# Patient Record
Sex: FEMALE | Race: BLACK OR AFRICAN AMERICAN | NOT HISPANIC OR LATINO | Employment: PART TIME | URBAN - METROPOLITAN AREA
[De-identification: names, ages, dates, MRNs, and addresses within clinical notes are randomized per-mention and may not be internally consistent; named-entity substitution may affect disease eponyms.]

---

## 2019-09-09 ENCOUNTER — SOCIAL WORK (OUTPATIENT)
Dept: ADMINISTRATIVE | Facility: OTHER | Age: 24
End: 2019-09-09

## 2019-09-09 NOTE — PROGRESS NOTES
SW met with pt regarding initial OB assessment. Pt stated this is her 2nd pregnancy/0-miscarriage. Pt stated lives with her grandmother/child-3 and able to perform ADL's independently. Pt stated support system is her aunt/Li/Mother-in-law/Kallie. Pt stated has medicaid([facility]). Pt stated does not have WIC. SW provide pt with information on other community resources.SW faxed and scanned pt's notification of pregnancy into epic.  No other needs identified at this time.    Melisa Mckenzie,MSW  Pager#[phone]

## 2019-09-26 PROBLEM — Z82.49 FAMILY HISTORY OF CARDIAC DISORDER: Status: ACTIVE | Noted: 2019-09-26

## 2019-09-26 PROBLEM — O09.299 H/O SHOULDER DYSTOCIA IN PRIOR PREGNANCY, CURRENTLY PREGNANT: Status: ACTIVE | Noted: 2019-09-26

## 2020-02-03 PROBLEM — K21.9 ACID REFLUX: Status: ACTIVE | Noted: 2020-02-03

## 2020-04-13 PROBLEM — Z3A.38 38 WEEKS GESTATION OF PREGNANCY: Status: ACTIVE | Noted: 2020-04-13

## 2020-04-13 PROBLEM — O26.893 ABDOMINAL PAIN DURING PREGNANCY, THIRD TRIMESTER: Status: ACTIVE | Noted: 2020-04-13

## 2020-04-13 PROBLEM — R10.9 ABDOMINAL PAIN DURING PREGNANCY, THIRD TRIMESTER: Status: ACTIVE | Noted: 2020-04-13

## 2020-04-15 PROBLEM — Z98.891 S/P PRIMARY LOW TRANSVERSE C-SECTION: Status: ACTIVE | Noted: 2020-04-15

## 2020-12-21 PROBLEM — O09.299 H/O SHOULDER DYSTOCIA IN PRIOR PREGNANCY, CURRENTLY PREGNANT: Status: RESOLVED | Noted: 2019-09-26 | Resolved: 2020-12-21

## 2021-03-22 PROBLEM — Z3A.38 38 WEEKS GESTATION OF PREGNANCY: Status: RESOLVED | Noted: 2020-04-13 | Resolved: 2021-03-22

## 2022-11-17 PROBLEM — R10.9 ABDOMINAL PAIN DURING PREGNANCY, THIRD TRIMESTER: Status: RESOLVED | Noted: 2020-04-13 | Resolved: 2022-11-17

## 2022-11-17 PROBLEM — O20.9 VAGINAL BLEEDING AFFECTING EARLY PREGNANCY: Status: ACTIVE | Noted: 2022-11-17

## 2022-11-17 PROBLEM — O03.9 MISCARRIAGE: Status: ACTIVE | Noted: 2022-11-17

## 2022-11-17 PROBLEM — O26.893 ABDOMINAL PAIN DURING PREGNANCY, THIRD TRIMESTER: Status: RESOLVED | Noted: 2020-04-13 | Resolved: 2022-11-17

## 2022-11-21 PROBLEM — O03.9 SPONTANEOUS ABORTION: Status: ACTIVE | Noted: 2022-11-21

## 2023-04-27 ENCOUNTER — SOCIAL WORK (OUTPATIENT)
Dept: ADMINISTRATIVE | Facility: OTHER | Age: 28
End: 2023-04-27

## 2023-04-27 NOTE — PROGRESS NOTES
SW met with pt regarding initial OB assessment. Pt stated this is her 4th pregnancy/1-miscarriage. Pt stated lives with her grandmother/children-7,3 and able to perform ADL's independently. Pt stated does work. Pt stated support system is her mother/Christina. Pt stated [facility] Pt stated does have WIC. Pt stated not going to breastfeed. SW provide pt with information on other community resources.KIRAN faxed and scanned pt's notification of pregnancy into epic.  No other needs identified at this time.    Melisa Mckenzie,MSW  Pager#[phone]

## 2023-05-01 PROBLEM — O23.40 UTI (URINARY TRACT INFECTION) DURING PREGNANCY: Status: ACTIVE | Noted: 2023-05-01

## 2023-05-01 PROBLEM — O99.210 OBESITY IN PREGNANCY: Status: ACTIVE | Noted: 2023-05-01

## 2023-05-01 PROBLEM — O03.9 SPONTANEOUS ABORTION: Status: RESOLVED | Noted: 2022-11-21 | Resolved: 2023-05-01

## 2023-05-01 PROBLEM — Z86.19 HISTORY OF SYPHILIS: Status: ACTIVE | Noted: 2023-05-01

## 2023-05-01 PROBLEM — O20.9 VAGINAL BLEEDING AFFECTING EARLY PREGNANCY: Status: RESOLVED | Noted: 2022-11-17 | Resolved: 2023-05-01

## 2023-05-01 PROBLEM — O03.9 MISCARRIAGE: Status: RESOLVED | Noted: 2022-11-17 | Resolved: 2023-05-01

## 2023-05-01 PROBLEM — K21.9 ACID REFLUX: Status: RESOLVED | Noted: 2020-02-03 | Resolved: 2023-05-01

## 2023-06-19 PROBLEM — R51.9 PREGNANCY HEADACHE IN SECOND TRIMESTER: Status: ACTIVE | Noted: 2023-06-19

## 2023-06-19 PROBLEM — R42 DIZZINESS: Status: ACTIVE | Noted: 2023-06-19

## 2023-06-19 PROBLEM — O26.892 PREGNANCY HEADACHE IN SECOND TRIMESTER: Status: ACTIVE | Noted: 2023-06-19

## 2023-06-29 PROBLEM — O30.90 CURRENT MULTIPLE PREGNANCY WITH HISTORY OF CONGENITAL HEART DISEASE IN PRIOR CHILD, ANTEPARTUM: Status: ACTIVE | Noted: 2023-06-29

## 2023-06-29 PROBLEM — O09.899 CURRENT MULTIPLE PREGNANCY WITH HISTORY OF CONGENITAL HEART DISEASE IN PRIOR CHILD, ANTEPARTUM: Status: ACTIVE | Noted: 2023-06-29

## 2023-07-31 PROBLEM — O23.40 UTI (URINARY TRACT INFECTION) DURING PREGNANCY: Status: RESOLVED | Noted: 2023-05-01 | Resolved: 2023-07-31

## 2023-09-25 PROBLEM — O99.810 ABNORMAL O'SULLIVAN GLUCOSE CHALLENGE TEST, ANTEPARTUM: Status: ACTIVE | Noted: 2023-09-25

## 2023-10-30 PROBLEM — O09.299 H/O SHOULDER DYSTOCIA IN PRIOR PREGNANCY, CURRENTLY PREGNANT: Status: RESOLVED | Noted: 2019-09-26 | Resolved: 2023-10-30

## 2023-11-01 PROBLEM — Z98.891 STATUS POST CESAREAN DELIVERY: Status: ACTIVE | Noted: 2023-11-01

## 2023-11-01 PROBLEM — Z30.09 UNWANTED FERTILITY: Status: ACTIVE | Noted: 2023-11-01

## 2023-11-01 PROBLEM — O23.43 URINARY TRACT INFECTION IN MOTHER DURING THIRD TRIMESTER OF PREGNANCY: Status: ACTIVE | Noted: 2023-11-01

## 2023-11-01 PROBLEM — Z98.51 STATUS POST TUBAL LIGATION: Status: ACTIVE | Noted: 2023-11-01

## 2023-11-01 PROBLEM — O23.591 TRICHOMONAL VAGINITIS DURING PREGNANCY IN FIRST TRIMESTER: Status: ACTIVE | Noted: 2023-11-01

## 2023-11-01 PROBLEM — O34.219 PREVIOUS CESAREAN DELIVERY AFFECTING PREGNANCY: Status: ACTIVE | Noted: 2023-11-01

## 2023-11-01 PROBLEM — A59.01 TRICHOMONAL VAGINITIS DURING PREGNANCY IN FIRST TRIMESTER: Status: ACTIVE | Noted: 2023-11-01

## 2024-02-05 PROBLEM — O23.43 URINARY TRACT INFECTION IN MOTHER DURING THIRD TRIMESTER OF PREGNANCY: Status: RESOLVED | Noted: 2023-11-01 | Resolved: 2024-02-05
